# Patient Record
Sex: MALE | Race: WHITE | NOT HISPANIC OR LATINO
[De-identification: names, ages, dates, MRNs, and addresses within clinical notes are randomized per-mention and may not be internally consistent; named-entity substitution may affect disease eponyms.]

---

## 2022-04-23 ENCOUNTER — TRANSCRIPTION ENCOUNTER (OUTPATIENT)
Age: 47
End: 2022-04-23

## 2022-08-26 PROBLEM — Z00.00 ENCOUNTER FOR PREVENTIVE HEALTH EXAMINATION: Status: ACTIVE | Noted: 2022-08-26

## 2022-08-29 ENCOUNTER — APPOINTMENT (OUTPATIENT)
Dept: RHEUMATOLOGY | Facility: CLINIC | Age: 47
End: 2022-08-29

## 2022-12-05 ENCOUNTER — APPOINTMENT (OUTPATIENT)
Dept: RHEUMATOLOGY | Facility: CLINIC | Age: 47
End: 2022-12-05

## 2022-12-05 VITALS
HEART RATE: 64 BPM | OXYGEN SATURATION: 98 % | BODY MASS INDEX: 28.23 KG/M2 | WEIGHT: 220 LBS | SYSTOLIC BLOOD PRESSURE: 132 MMHG | DIASTOLIC BLOOD PRESSURE: 88 MMHG | TEMPERATURE: 97 F | HEIGHT: 74 IN

## 2022-12-05 DIAGNOSIS — M79.675 PAIN IN RIGHT TOE(S): ICD-10-CM

## 2022-12-05 DIAGNOSIS — Z87.19 PERSONAL HISTORY OF OTHER DISEASES OF THE DIGESTIVE SYSTEM: ICD-10-CM

## 2022-12-05 DIAGNOSIS — Z78.9 OTHER SPECIFIED HEALTH STATUS: ICD-10-CM

## 2022-12-05 DIAGNOSIS — M79.674 PAIN IN RIGHT TOE(S): ICD-10-CM

## 2022-12-05 DIAGNOSIS — M25.50 PAIN IN UNSPECIFIED JOINT: ICD-10-CM

## 2022-12-05 DIAGNOSIS — M25.519 PAIN IN UNSPECIFIED SHOULDER: ICD-10-CM

## 2022-12-05 DIAGNOSIS — R79.89 OTHER SPECIFIED ABNORMAL FINDINGS OF BLOOD CHEMISTRY: ICD-10-CM

## 2022-12-05 PROCEDURE — 99205 OFFICE O/P NEW HI 60 MIN: CPT | Mod: 25

## 2022-12-05 PROCEDURE — 36415 COLL VENOUS BLD VENIPUNCTURE: CPT

## 2022-12-05 RX ORDER — OMEPRAZOLE 20 MG/1
20 CAPSULE, DELAYED RELEASE ORAL
Refills: 0 | Status: ACTIVE | COMMUNITY

## 2022-12-05 NOTE — HISTORY OF PRESENT ILLNESS
[FreeTextEntry1] : 47-year-old male, here for the first time w/ PMH of GERD; with +PITER 1:80 speckled, nuclear, nuclear dots; +RNP=1.3 on labs with PCP.\par He denies any pain or swelling or stiffness in the hands.\par He denies any pain in the wrist.\par He denies any pain in the elbows.\par States he notices left more than right shoulder soreness with rotation intermittently for the past 7 months.\par Has good ROM in b/l shoulders, no pelvic/girdle stiffness and able to stand up without using her hands, no temporal pain/unremitting headaches, no vision changes, no jaw pain.\par States he can notice him intermittent neck pain with rotation without paresthesias at this time.\par States he can notice intermittent knee pain with going up and down the stairs.  Denies any crystal arthropathy like attacks.\par States he notices intermittent soreness in his toes.\par States he has history of Planter fasciitis treated with steroid injections in the past without symptoms at this time.\par Denies any fever/chills, no rashes, no ulcers, no dry eyes, no dry mouth, no raynaud's, no infectious diarrhea or  symptoms at this time.\par He denies any cough or shortness of breath at this time.\par He denies any history of blood clots and no stroke.

## 2022-12-05 NOTE — PHYSICAL EXAM
[General Appearance - Alert] : alert [General Appearance - In No Acute Distress] : in no acute distress [Sclera] : the sclera and conjunctiva were normal [Extraocular Movements] : extraocular movements were intact [Outer Ear] : the ears and nose were normal in appearance [Neck Appearance] : the appearance of the neck was normal [Respiration, Rhythm And Depth] : normal respiratory rhythm and effort [Heart Rate And Rhythm] : heart rate was normal and rhythm regular [Heart Sounds] : normal S1 and S2 [Abdomen Soft] : soft [Abdomen Tenderness] : non-tender [Cervical Lymph Nodes Enlarged Anterior Bilaterally] : anterior cervical [Supraclavicular Lymph Nodes Enlarged Bilaterally] : supraclavicular [No CVA Tenderness] : no ~M costovertebral angle tenderness [Motor Tone] : muscle strength and tone were normal [] : no rash [No Focal Deficits] : no focal deficits [Impaired Insight] : insight and judgment were intact [Mood] : the mood was normal [FreeTextEntry1] : mild heberden nodes at DIPs; No synovitis or effusion on exam noted today; Lt>Rt shoulder tenderness; Good ROM in b/l shoulders, no pelvic/girdle stiffness and able to stand up without using his hands

## 2022-12-05 NOTE — ASSESSMENT
[FreeTextEntry1] : 47-year-old male, here for the first time w/ PMH of GERD; mild OA hands; w/ +PITER 1:80 speckled, nuclear, nuclear dots; +RNP=1.3 on labs w/ PCP; reports of intermittent joint aches in the Lt>Rt shoulders; knees, feet, c-spine to rule out inflammatory arthropathy incld MCTD or RA.\par -No synovitis or effusion on exam noted today and advised to monitor.\par -reviewed labs 5/3/22 [facility] from PCP, Dr. Edwards with +PITER 1:80 speckled, nuclear, nuclear dots; +RNP=1.3; low TSH=0.16 (denies tx and will recheck & told to monitor w/ PCP also); DS-DNA negative; Sm negative; RF negative; CCP negative; uric acid=5; CMP ok; CBC ok; CRP normal; lyme negative\par -labs as below incld ESR, CRP, serologies, TSH\par -Referred for xray of b/l feet to evaluate for structural changes, r/o erosions\par \par +PITER 1:80 speckled, nuclear, nuclear dots:\par -Clinically without much symptoms of CTD/lupus/sjogren syndrome at this time and educated on symptoms to monitor for in detail if he evolves.\par -lab as below w/ disease activity markers as below to be complete\par -will check thyroid abs as discussed +PITER can be seen with cross reactivity\par \par +RNP: not much MCTD symptoms at this time and educated on symptoms to monitor for if he evolves \par -referred to Pulm for PFTs/chest imaging for +RNP to r/o ILD as discussed \par -referred for Echo to r/o pulmonary hypertension w/ +RNP\par \par Lt >Rt shoulder pain:\par -Has good ROM in b/l shoulders, no pelvic/girdle stiffness and able to stand up without using her hands, no temporal pain/unremitting headaches, no vision changes, no jaw pain.\par -Referred for xray of bilateral shoulders to evaluate for structural changes, eval for calcific tendonitis, high riding humerus/rotator cuff pathology\par -discussed she can consider steroid injection if needed\par \par Cervicalgia: tenderness in c-spine w/ rotation w/ good ROM w/o paresthesias \par -Referred for xray of c-spine to evaluate for structural changes, DDD; muscle spasm \par \par Knee pain -Referred for xray of b/l knees to evaluate for structural changes, OA\par -consider steroid injections \par \par -educated on symptoms to monitor for in detail and alert us if any concerns.\par -knows to stay up to date on health maintenance w/ PCP\par -f/u in 10-14 days w/ labs, xrays please\par \par

## 2022-12-12 LAB
ACE BLD-CCNC: 80 U/L
ALBUMIN SERPL ELPH-MCNC: 4.6 G/DL
ALP BLD-CCNC: 97 U/L
ALT SERPL-CCNC: 25 U/L
ANA SER IF-ACNC: NEGATIVE
ANION GAP SERPL CALC-SCNC: 10 MMOL/L
AST SERPL-CCNC: 19 U/L
B19V IGG SER QL IA: 0.25 INDEX
B19V IGG+IGM SER-IMP: NEGATIVE
B19V IGG+IGM SER-IMP: NORMAL
B19V IGM FLD-ACNC: 0.19 INDEX
B19V IGM SER-ACNC: NEGATIVE
BASOPHILS # BLD AUTO: 0.03 K/UL
BASOPHILS NFR BLD AUTO: 0.6 %
BILIRUB SERPL-MCNC: 0.7 MG/DL
BUN SERPL-MCNC: 23 MG/DL
C3 SERPL-MCNC: 145 MG/DL
C4 SERPL-MCNC: 35 MG/DL
CALCIUM SERPL-MCNC: 9.6 MG/DL
CHLORIDE SERPL-SCNC: 107 MMOL/L
CO2 SERPL-SCNC: 22 MMOL/L
CREAT SERPL-MCNC: 1.1 MG/DL
CREAT SPEC-SCNC: 162 MG/DL
CREAT/PROT UR: 0.1 RATIO
CRP SERPL-MCNC: 6 MG/L
DSDNA AB SER-ACNC: <12 IU/ML
EGFR: 82 ML/MIN/1.73M2
ENA RNP AB SER IA-ACNC: 1.3 AL
ENA SM AB SER IA-ACNC: <0.2 AL
ENA SS-A AB SER IA-ACNC: <0.2 AL
ENA SS-B AB SER IA-ACNC: <0.2 AL
EOSINOPHIL # BLD AUTO: 0.13 K/UL
EOSINOPHIL NFR BLD AUTO: 2.5 %
ERYTHROCYTE [SEDIMENTATION RATE] IN BLOOD BY WESTERGREN METHOD: 19 MM/HR
G6PD SER-CCNC: 16 U/G HGB
GLUCOSE SERPL-MCNC: 99 MG/DL
HAV IGM SER QL: NONREACTIVE
HBV CORE IGM SER QL: NONREACTIVE
HBV SURFACE AG SER QL: NONREACTIVE
HCT VFR BLD CALC: 42.4 %
HCV AB SER QL: NONREACTIVE
HCV S/CO RATIO: 0.08 S/CO
HGB BLD-MCNC: 14.9 G/DL
HIV1+2 AB SPEC QL IA.RAPID: NONREACTIVE
IMM GRANULOCYTES NFR BLD AUTO: 0.4 %
LYMPHOCYTES # BLD AUTO: 1.35 K/UL
LYMPHOCYTES NFR BLD AUTO: 26.3 %
MAN DIFF?: NORMAL
MCHC RBC-ENTMCNC: 27.9 PG
MCHC RBC-ENTMCNC: 35.1 GM/DL
MCV RBC AUTO: 79.4 FL
MONOCYTES # BLD AUTO: 0.41 K/UL
MONOCYTES NFR BLD AUTO: 8 %
NEUTROPHILS # BLD AUTO: 3.19 K/UL
NEUTROPHILS NFR BLD AUTO: 62.2 %
PLATELET # BLD AUTO: 237 K/UL
POTASSIUM SERPL-SCNC: 4.2 MMOL/L
PROT SERPL-MCNC: 7.1 G/DL
PROT UR-MCNC: 10 MG/DL
RBC # BLD: 5.34 M/UL
RBC # FLD: 13.2 %
SODIUM SERPL-SCNC: 138 MMOL/L
THYROGLOB AB SERPL-ACNC: <20 IU/ML
THYROPEROXIDASE AB SERPL IA-ACNC: <10 IU/ML
TSH SERPL-ACNC: 0.77 UIU/ML
WBC # FLD AUTO: 5.13 K/UL

## 2022-12-18 LAB — HLA-B27 RELATED AG QL: NEGATIVE

## 2023-01-03 ENCOUNTER — NON-APPOINTMENT (OUTPATIENT)
Age: 48
End: 2023-01-03

## 2023-01-04 ENCOUNTER — APPOINTMENT (OUTPATIENT)
Dept: PULMONOLOGY | Facility: CLINIC | Age: 48
End: 2023-01-04
Payer: COMMERCIAL

## 2023-01-04 VITALS
DIASTOLIC BLOOD PRESSURE: 93 MMHG | SYSTOLIC BLOOD PRESSURE: 135 MMHG | WEIGHT: 220 LBS | RESPIRATION RATE: 18 BRPM | OXYGEN SATURATION: 95 % | HEART RATE: 70 BPM | HEIGHT: 74 IN | BODY MASS INDEX: 28.23 KG/M2 | TEMPERATURE: 97.3 F

## 2023-01-04 DIAGNOSIS — R06.00 DYSPNEA, UNSPECIFIED: ICD-10-CM

## 2023-01-04 PROCEDURE — 99204 OFFICE O/P NEW MOD 45 MIN: CPT

## 2023-01-04 NOTE — ASSESSMENT
[FreeTextEntry1] : 46 yo man,  in [facility]\par Referred by Dr Marquez for pulmonary evaluation\par Noted to have minimal +PITER and arthralgias, and "fatigue'\par No definite neurologic diagnosis has been made however\par Nonsmoker with no previous pulmonary disease, asthma, pneumonia, etc\par COVID in early 2022 but not severe illness, has been vaxx x2\par Generally active as a PO\par Can run 4-5 miles without difficulty or dyspnea on a treadmill\par He was a professional  \par  two children\par FH positive for HT, brother had testicular cancer\par Meds Omeprazole\par No diabetes HT CAD \par s/p hernia repair, neuroma left foot\par \par Imp\par 46 yo man with no previous pulmonary disease, nonsmoker who is active and has excellent cardiopulmonary function\par ECHO in 5/22 was normal\par PE is noncontributory\par Do not expect to obtain any further info from PFTs at this time\par Would get CXR, if abnormalities are noted, CT may be useful but, again, the PE does not suggest that there will be findings\par Has no definitive rheumatologic diagnosis \par Will be glad to see again as required

## 2023-01-04 NOTE — HISTORY OF PRESENT ILLNESS
[TextBox_4] : 46 yo man,  in [facility]\par Referred by Dr Marquez for pulmononary evaluation\par Noted to have minimal +PITER and arthralgias, and "fatigue'\par No definite neurologic diagnosis has been made however\par Nonsmoker with no previous pulmonary disease, asthma, pneumonia, etc\par COVID in early 2022 but not severe illness, has been vaxx x2\par Generally active as a PO\par Can run 4-5 miles without difficulty or dyspnea on a treadmill\par He was a professional  \par  two children\par FH positive for HT, brother had testicular cancer\par Meds Omeprazole\par No diabetes HT CAD \par s/p hernia repair, neuroma left foot

## 2023-03-13 ENCOUNTER — APPOINTMENT (OUTPATIENT)
Dept: RHEUMATOLOGY | Facility: CLINIC | Age: 48
End: 2023-03-13
Payer: COMMERCIAL

## 2023-03-13 VITALS
BODY MASS INDEX: 30.56 KG/M2 | SYSTOLIC BLOOD PRESSURE: 128 MMHG | HEART RATE: 67 BPM | TEMPERATURE: 97.9 F | DIASTOLIC BLOOD PRESSURE: 76 MMHG | WEIGHT: 238 LBS | OXYGEN SATURATION: 97 %

## 2023-03-13 DIAGNOSIS — M19.041 PRIMARY OSTEOARTHRITIS, RIGHT HAND: ICD-10-CM

## 2023-03-13 DIAGNOSIS — R76.8 OTHER SPECIFIED ABNORMAL IMMUNOLOGICAL FINDINGS IN SERUM: ICD-10-CM

## 2023-03-13 DIAGNOSIS — M19.042 PRIMARY OSTEOARTHRITIS, RIGHT HAND: ICD-10-CM

## 2023-03-13 DIAGNOSIS — M25.569 PAIN IN UNSPECIFIED KNEE: ICD-10-CM

## 2023-03-13 DIAGNOSIS — R79.82 ELEVATED C-REACTIVE PROTEIN (CRP): ICD-10-CM

## 2023-03-13 DIAGNOSIS — M54.2 CERVICALGIA: ICD-10-CM

## 2023-03-13 DIAGNOSIS — R70.0 ELEVATED ERYTHROCYTE SEDIMENTATION RATE: ICD-10-CM

## 2023-03-13 PROCEDURE — 99214 OFFICE O/P EST MOD 30 MIN: CPT

## 2023-03-13 NOTE — HISTORY OF PRESENT ILLNESS
[FreeTextEntry1] : 47-year-old male, here for the first time w/ PMH of GERD; with hx of +PITER 1:80 speckled, nuclear, nuclear dots; +RNP=1.3 on labs.\par Denies any swelling or redness or warmth of any joints at this time. \par He denies any pain or swelling or stiffness in the hands.\par He denies any pain in the wrist.\par He denies any pain in the elbows.\par He has good ROM in b/l shoulders without tenderness, no pelvic/girdle stiffness and able to stand up without using her hands, no temporal pain/unremitting headaches, no vision changes, no jaw pain.\par States he can notice him intermittent neck pain with rotation without paresthesias at this time.\par States he can notice intermittent knee pain with going up and down the stairs. Denies any crystal arthropathy like attacks.\par States he has history of Planter fasciitis treated with steroid injections in the past without symptoms at this time.\par Denies any fever/chills, no rashes, no ulcers, no dry eyes, no dry mouth, no raynaud's, no infectious diarrhea or  symptoms at this time.\par He denies any cough or shortness of breath at this time.\par He denies any history of blood clots and no stroke. \par

## 2023-03-13 NOTE — ASSESSMENT
[FreeTextEntry1] : 47-year-old male, here for first follow up w/ PMH of GERD; mild OA hands; w/ +PITER 1:80 speckled, nuclear, nuclear dots; +RNP=1.3 without much symptoms of MCTD or SLE at this time. \par -No synovitis or effusion on exam noted today and advised to monitor.\par -reviewed labs 12/5/23 w/ +RNP=1.3; mildly high ESR=19; high CRP=6; PITER negative on repeat; DS-DNA negative; C3/C4 normal; urine prot/creat ratio=0.1; TSH normal; serologies stable at this time; 5/3/22 [facility] from PCP, Dr. Edwards with +PITER 1:80 speckled, nuclear, nuclear dots; +RNP=1.3; low TSH=0.16 (denies tx and will recheck & told to monitor w/ PCP also); DS-DNA negative; Sm negative; RF negative; CCP negative; uric acid=5; CMP ok; CBC ok; CRP normal; lyme negative\par -labs as below incld ESR, CRP, serologies, TSH\par -Referred for xray of b/l feet to evaluate for structural changes, r/o erosions\par \par +PITER 1:80 speckled, nuclear, nuclear dots: PITER negative on repeat 12/5/22 \par -Clinically without much symptoms of CTD/lupus/sjogren syndrome at this time and educated on symptoms to monitor for in detail if he evolves.\par -reviewed labs 12/5/23 w/ PITER negative on repeat; DS-DNA negative; C3/C4 normal; urine prot/creat ratio=0.1\par -thyroid abs Negative; checked to see if +PITER from cross reactivity\par \par +RNP: not much MCTD symptoms at this time and educated on symptoms to monitor for if he evolves \par -states he saw Pulm with workup ok there without ILD to monitor there\par -states he had Echo to r/o pulmonary hypertension w/ +RNP that was normal to monitor there\par \par Raised ESR/CRP: \par -denies any signs of infection; no synovitis; denies any wt loss or night sweats and knows to stay up to date on health maintenance w/ PCP \par -will monitor on repeat\par \par Cervicalgia: tenderness in c-spine w/ rotation w/ good ROM w/o paresthesias \par -Referred for xray of c-spine to evaluate for structural changes, DDD; muscle spasm & call for results \par \par Intermittent Knee pain -Referred for xray of b/l knees to evaluate for structural changes, OA & call for results \par -consider steroid injections if pain \par \par -educated on symptoms to monitor for in detail and alert us if any concerns.\par -knows to stay up to date on health maintenance w/ PCP\par -f/u in 9 mo w/ labs, xrays please or sooner as needed \par

## 2023-03-13 NOTE — PHYSICAL EXAM
[General Appearance - Alert] : alert [General Appearance - In No Acute Distress] : in no acute distress [Sclera] : the sclera and conjunctiva were normal [Extraocular Movements] : extraocular movements were intact [Outer Ear] : the ears and nose were normal in appearance [Neck Appearance] : the appearance of the neck was normal [Respiration, Rhythm And Depth] : normal respiratory rhythm and effort [Heart Rate And Rhythm] : heart rate was normal and rhythm regular [Heart Sounds] : normal S1 and S2 [Abdomen Soft] : soft [Abdomen Tenderness] : non-tender [Cervical Lymph Nodes Enlarged Anterior Bilaterally] : anterior cervical [Supraclavicular Lymph Nodes Enlarged Bilaterally] : supraclavicular [No CVA Tenderness] : no ~M costovertebral angle tenderness [Motor Tone] : muscle strength and tone were normal [] : no rash [No Focal Deficits] : no focal deficits [Impaired Insight] : insight and judgment were intact [Mood] : the mood was normal [FreeTextEntry1] : No synovitis or effusion on exam noted today; Good ROM in b/l shoulders, no pelvic/girdle stiffness and able to stand up without using his hands

## 2023-03-13 NOTE — REASON FOR VISIT
[Follow-Up: _____] : a [unfilled] follow-up visit [FreeTextEntry1] : +RNP; hx of +PITER; review labs/symptoms \par  \par

## 2025-09-10 ENCOUNTER — APPOINTMENT (OUTPATIENT)
Dept: PODIATRY | Facility: CLINIC | Age: 50
End: 2025-09-10

## 2025-09-10 DIAGNOSIS — M79.672 PAIN IN LEFT FOOT: ICD-10-CM

## 2025-09-10 DIAGNOSIS — B07.8 OTHER VIRAL WARTS: ICD-10-CM

## 2025-09-12 PROBLEM — M79.672 LEFT FOOT PAIN: Status: ACTIVE | Noted: 2025-09-12

## 2025-09-12 PROBLEM — B07.8 VERRUCA PLANA: Status: ACTIVE | Noted: 2025-09-12
